# Patient Record
Sex: FEMALE | Race: WHITE | Employment: OTHER | URBAN - METROPOLITAN AREA
[De-identification: names, ages, dates, MRNs, and addresses within clinical notes are randomized per-mention and may not be internally consistent; named-entity substitution may affect disease eponyms.]

---

## 2018-01-01 ENCOUNTER — ANESTHESIA EVENT (OUTPATIENT)
Dept: SURGERY | Age: 83
End: 2018-01-01
Payer: MEDICARE

## 2018-01-01 ENCOUNTER — APPOINTMENT (OUTPATIENT)
Dept: GENERAL RADIOLOGY | Age: 83
End: 2018-01-01
Attending: ORTHOPAEDIC SURGERY
Payer: MEDICARE

## 2018-01-01 ENCOUNTER — ANESTHESIA (OUTPATIENT)
Dept: SURGERY | Age: 83
End: 2018-01-01
Payer: MEDICARE

## 2018-01-01 ENCOUNTER — HOSPITAL ENCOUNTER (OUTPATIENT)
Dept: GENERAL RADIOLOGY | Age: 83
Discharge: HOME | End: 2018-06-25
Payer: MEDICARE

## 2018-01-01 ENCOUNTER — HOSPITAL ENCOUNTER (OUTPATIENT)
Age: 83
End: 2018-07-18
Attending: ORTHOPAEDIC SURGERY | Admitting: ORTHOPAEDIC SURGERY
Payer: MEDICARE

## 2018-01-01 ENCOUNTER — ANESTHESIA EVENT (OUTPATIENT)
Dept: MEDSURG UNIT | Age: 83
End: 2018-01-01
Payer: MEDICARE

## 2018-01-01 ENCOUNTER — ANESTHESIA (OUTPATIENT)
Dept: MEDSURG UNIT | Age: 83
End: 2018-01-01
Payer: MEDICARE

## 2018-01-01 ENCOUNTER — HOSPITAL ENCOUNTER (OUTPATIENT)
Dept: PREADMISSION TESTING | Age: 83
Discharge: HOME | End: 2018-06-25
Payer: MEDICARE

## 2018-01-01 VITALS
WEIGHT: 227.31 LBS | TEMPERATURE: 98.1 F | OXYGEN SATURATION: 99 % | SYSTOLIC BLOOD PRESSURE: 166 MMHG | DIASTOLIC BLOOD PRESSURE: 91 MMHG | HEART RATE: 91 BPM | RESPIRATION RATE: 16 BRPM | HEIGHT: 65 IN | BODY MASS INDEX: 37.87 KG/M2

## 2018-01-01 VITALS — WEIGHT: 229.5 LBS | BODY MASS INDEX: 38.24 KG/M2 | HEIGHT: 65 IN

## 2018-01-01 DIAGNOSIS — R06.02 SHORTNESS OF BREATH: ICD-10-CM

## 2018-01-01 LAB
ABO + RH BLD: NORMAL
ANION GAP SERPL CALC-SCNC: 13 MMOL/L (ref 3–18)
ANION GAP SERPL CALC-SCNC: 9 MMOL/L (ref 3–18)
ATRIAL RATE: 76 BPM
BACTERIA SPEC CULT: NORMAL
BLOOD GROUP ANTIBODIES SERPL: NORMAL
BUN SERPL-MCNC: 20 MG/DL (ref 7–18)
BUN SERPL-MCNC: 21 MG/DL (ref 7–18)
BUN/CREAT SERPL: 18 (ref 12–20)
BUN/CREAT SERPL: 26 (ref 12–20)
CALCIUM SERPL-MCNC: 8.7 MG/DL (ref 8.5–10.1)
CALCIUM SERPL-MCNC: 8.9 MG/DL (ref 8.5–10.1)
CALCULATED P AXIS, ECG09: -26 DEGREES
CALCULATED R AXIS, ECG10: 62 DEGREES
CALCULATED T AXIS, ECG11: 56 DEGREES
CHLORIDE SERPL-SCNC: 100 MMOL/L (ref 100–108)
CHLORIDE SERPL-SCNC: 101 MMOL/L (ref 100–108)
CK MB CFR SERPL CALC: ABNORMAL % (ref 0–4)
CK MB SERPL-MCNC: <1 NG/ML (ref 5–25)
CK SERPL-CCNC: 570 U/L (ref 26–192)
CO2 SERPL-SCNC: 25 MMOL/L (ref 21–32)
CO2 SERPL-SCNC: 29 MMOL/L (ref 21–32)
CREAT SERPL-MCNC: 0.8 MG/DL (ref 0.6–1.3)
CREAT SERPL-MCNC: 1.11 MG/DL (ref 0.6–1.3)
DIAGNOSIS, 93000: NORMAL
ERYTHROCYTE [DISTWIDTH] IN BLOOD BY AUTOMATED COUNT: 13.6 % (ref 11.6–14.5)
ERYTHROCYTE [DISTWIDTH] IN BLOOD BY AUTOMATED COUNT: 13.7 % (ref 11.6–14.5)
GLUCOSE BLD STRIP.AUTO-MCNC: 157 MG/DL (ref 70–110)
GLUCOSE SERPL-MCNC: 191 MG/DL (ref 74–99)
GLUCOSE SERPL-MCNC: 84 MG/DL (ref 74–99)
HCT VFR BLD AUTO: 41.5 % (ref 35–45)
HCT VFR BLD AUTO: 41.8 % (ref 35–45)
HGB BLD-MCNC: 13.4 G/DL (ref 12–16)
HGB BLD-MCNC: 13.7 G/DL (ref 12–16)
MCH RBC QN AUTO: 28.2 PG (ref 24–34)
MCH RBC QN AUTO: 28.3 PG (ref 24–34)
MCHC RBC AUTO-ENTMCNC: 32.3 G/DL (ref 31–37)
MCHC RBC AUTO-ENTMCNC: 32.8 G/DL (ref 31–37)
MCV RBC AUTO: 86.2 FL (ref 74–97)
MCV RBC AUTO: 87.7 FL (ref 74–97)
P-R INTERVAL, ECG05: 192 MS
PLATELET # BLD AUTO: 228 K/UL (ref 135–420)
PLATELET # BLD AUTO: 248 K/UL (ref 135–420)
PMV BLD AUTO: 10.7 FL (ref 9.2–11.8)
PMV BLD AUTO: 11.2 FL (ref 9.2–11.8)
POTASSIUM SERPL-SCNC: 4.1 MMOL/L (ref 3.5–5.5)
POTASSIUM SERPL-SCNC: 4.2 MMOL/L (ref 3.5–5.5)
Q-T INTERVAL, ECG07: 402 MS
QRS DURATION, ECG06: 72 MS
QTC CALCULATION (BEZET), ECG08: 452 MS
RBC # BLD AUTO: 4.73 M/UL (ref 4.2–5.3)
RBC # BLD AUTO: 4.85 M/UL (ref 4.2–5.3)
SERVICE CMNT-IMP: NORMAL
SODIUM SERPL-SCNC: 138 MMOL/L (ref 136–145)
SODIUM SERPL-SCNC: 139 MMOL/L (ref 136–145)
SPECIMEN EXP DATE BLD: NORMAL
TROPONIN I SERPL-MCNC: 0.04 NG/ML (ref 0–0.06)
VENTRICULAR RATE, ECG03: 76 BPM
WBC # BLD AUTO: 14.3 K/UL (ref 4.6–13.2)
WBC # BLD AUTO: 8.7 K/UL (ref 4.6–13.2)

## 2018-01-01 PROCEDURE — 74011250636 HC RX REV CODE- 250/636: Performed by: ORTHOPAEDIC SURGERY

## 2018-01-01 PROCEDURE — 77030018836 HC SOL IRR NACL ICUM -A: Performed by: ORTHOPAEDIC SURGERY

## 2018-01-01 PROCEDURE — 74011000250 HC RX REV CODE- 250: Performed by: ORTHOPAEDIC SURGERY

## 2018-01-01 PROCEDURE — 74011000272 HC RX REV CODE- 272: Performed by: ORTHOPAEDIC SURGERY

## 2018-01-01 PROCEDURE — 77030020782 HC GWN BAIR PAWS FLX 3M -B: Performed by: ORTHOPAEDIC SURGERY

## 2018-01-01 PROCEDURE — 86900 BLOOD TYPING SEROLOGIC ABO: CPT | Performed by: ORTHOPAEDIC SURGERY

## 2018-01-01 PROCEDURE — 77030008683 HC TU ET CUF COVD -A: Performed by: ANESTHESIOLOGY

## 2018-01-01 PROCEDURE — 77030032490 HC SLV COMPR SCD KNE COVD -B: Performed by: ORTHOPAEDIC SURGERY

## 2018-01-01 PROCEDURE — 74011250637 HC RX REV CODE- 250/637: Performed by: PHYSICIAN ASSISTANT

## 2018-01-01 PROCEDURE — G8979 MOBILITY GOAL STATUS: HCPCS

## 2018-01-01 PROCEDURE — 77030008462 HC STPLR SKN PROX J&J -A: Performed by: ORTHOPAEDIC SURGERY

## 2018-01-01 PROCEDURE — 80048 BASIC METABOLIC PNL TOTAL CA: CPT | Performed by: INTERNAL MEDICINE

## 2018-01-01 PROCEDURE — 77030034849: Performed by: ORTHOPAEDIC SURGERY

## 2018-01-01 PROCEDURE — 97162 PT EVAL MOD COMPLEX 30 MIN: CPT

## 2018-01-01 PROCEDURE — 77030013079 HC BLNKT BAIR HGGR 3M -A: Performed by: ANESTHESIOLOGY

## 2018-01-01 PROCEDURE — 77010033678 HC OXYGEN DAILY

## 2018-01-01 PROCEDURE — 74011250636 HC RX REV CODE- 250/636: Performed by: PHYSICIAN ASSISTANT

## 2018-01-01 PROCEDURE — 77030003029 HC SUT VCRL J&J -B: Performed by: ORTHOPAEDIC SURGERY

## 2018-01-01 PROCEDURE — 36415 COLL VENOUS BLD VENIPUNCTURE: CPT | Performed by: PHYSICIAN ASSISTANT

## 2018-01-01 PROCEDURE — C1713 ANCHOR/SCREW BN/BN,TIS/BN: HCPCS | Performed by: ORTHOPAEDIC SURGERY

## 2018-01-01 PROCEDURE — 76210000016 HC OR PH I REC 1 TO 1.5 HR: Performed by: ORTHOPAEDIC SURGERY

## 2018-01-01 PROCEDURE — 93005 ELECTROCARDIOGRAM TRACING: CPT

## 2018-01-01 PROCEDURE — 85027 COMPLETE CBC AUTOMATED: CPT | Performed by: ORTHOPAEDIC SURGERY

## 2018-01-01 PROCEDURE — 85027 COMPLETE CBC AUTOMATED: CPT | Performed by: PHYSICIAN ASSISTANT

## 2018-01-01 PROCEDURE — 36415 COLL VENOUS BLD VENIPUNCTURE: CPT | Performed by: ORTHOPAEDIC SURGERY

## 2018-01-01 PROCEDURE — 74011000258 HC RX REV CODE- 258: Performed by: ORTHOPAEDIC SURGERY

## 2018-01-01 PROCEDURE — 80048 BASIC METABOLIC PNL TOTAL CA: CPT | Performed by: ORTHOPAEDIC SURGERY

## 2018-01-01 PROCEDURE — 74011250637 HC RX REV CODE- 250/637: Performed by: ANESTHESIOLOGY

## 2018-01-01 PROCEDURE — 82962 GLUCOSE BLOOD TEST: CPT

## 2018-01-01 PROCEDURE — 92950 HEART/LUNG RESUSCITATION CPR: CPT

## 2018-01-01 PROCEDURE — 77030013708 HC HNDPC SUC IRR PULS STRY –B: Performed by: ORTHOPAEDIC SURGERY

## 2018-01-01 PROCEDURE — 77030011640 HC PAD GRND REM COVD -A: Performed by: ORTHOPAEDIC SURGERY

## 2018-01-01 PROCEDURE — 82550 ASSAY OF CK (CPK): CPT | Performed by: INTERNAL MEDICINE

## 2018-01-01 PROCEDURE — 77030008477 HC STYL SATN SLP COVD -A: Performed by: ANESTHESIOLOGY

## 2018-01-01 PROCEDURE — 77030003028 HC SUT VCRL J&J -A: Performed by: ORTHOPAEDIC SURGERY

## 2018-01-01 PROCEDURE — 87641 MR-STAPH DNA AMP PROBE: CPT | Performed by: ORTHOPAEDIC SURGERY

## 2018-01-01 PROCEDURE — 77030004402 HC BUR NEUR STRY -C: Performed by: ORTHOPAEDIC SURGERY

## 2018-01-01 PROCEDURE — 71046 X-RAY EXAM CHEST 2 VIEWS: CPT

## 2018-01-01 PROCEDURE — 77030012406 HC DRN WND PENRS BARD -A: Performed by: ORTHOPAEDIC SURGERY

## 2018-01-01 PROCEDURE — 97530 THERAPEUTIC ACTIVITIES: CPT

## 2018-01-01 PROCEDURE — G8978 MOBILITY CURRENT STATUS: HCPCS

## 2018-01-01 PROCEDURE — 74011250636 HC RX REV CODE- 250/636

## 2018-01-01 PROCEDURE — 76010000132 HC OR TIME 2.5 TO 3 HR: Performed by: ORTHOPAEDIC SURGERY

## 2018-01-01 PROCEDURE — 77030014650 HC SEAL MTRX FLOSEL BAXT -C: Performed by: ORTHOPAEDIC SURGERY

## 2018-01-01 PROCEDURE — 31500 INSERT EMERGENCY AIRWAY: CPT

## 2018-01-01 PROCEDURE — 76060000036 HC ANESTHESIA 2.5 TO 3 HR: Performed by: ORTHOPAEDIC SURGERY

## 2018-01-01 DEVICE — IMPLANTABLE DEVICE: Type: IMPLANTABLE DEVICE | Site: SPINE LUMBAR | Status: FUNCTIONAL

## 2018-01-01 DEVICE — SET SCR SPNL L5-7MM PEDFUSE: Type: IMPLANTABLE DEVICE | Site: SPINE LUMBAR | Status: FUNCTIONAL

## 2018-01-01 DEVICE — SCREW SPNL L55MM OD7MM SLD GEN 3 PEDFUSE RESET: Type: IMPLANTABLE DEVICE | Site: SPINE LUMBAR | Status: FUNCTIONAL

## 2018-01-01 DEVICE — SCREW SPNL L50MM OD7MM SLD GEN 3 PEDFUSE RESET: Type: IMPLANTABLE DEVICE | Site: SPINE LUMBAR | Status: FUNCTIONAL

## 2018-01-01 DEVICE — ROD SPNL L100MM OD5.5MM LORDTC PEDFUSE: Type: IMPLANTABLE DEVICE | Site: SPINE LUMBAR | Status: FUNCTIONAL

## 2018-01-01 DEVICE — SCREW SPNL CRV 45-65 MM ADJ CRUX ASSY: Type: IMPLANTABLE DEVICE | Site: SPINE LUMBAR | Status: FUNCTIONAL

## 2018-01-01 DEVICE — ROD SPNL L90MM OD5.5MM PEDFUSE: Type: IMPLANTABLE DEVICE | Site: SPINE LUMBAR | Status: FUNCTIONAL

## 2018-01-01 RX ORDER — CEFAZOLIN SODIUM/WATER 2 G/20 ML
2 SYRINGE (ML) INTRAVENOUS EVERY 8 HOURS
Status: DISCONTINUED | OUTPATIENT
Start: 2018-01-01 | End: 2018-01-01 | Stop reason: HOSPADM

## 2018-01-01 RX ORDER — DIPHENHYDRAMINE HYDROCHLORIDE 50 MG/ML
12.5 INJECTION, SOLUTION INTRAMUSCULAR; INTRAVENOUS
Status: DISCONTINUED | OUTPATIENT
Start: 2018-01-01 | End: 2018-01-01 | Stop reason: HOSPADM

## 2018-01-01 RX ORDER — DOCUSATE SODIUM 100 MG/1
100 CAPSULE, LIQUID FILLED ORAL 2 TIMES DAILY
Status: DISCONTINUED | OUTPATIENT
Start: 2018-01-01 | End: 2018-01-01 | Stop reason: HOSPADM

## 2018-01-01 RX ORDER — GLYCOPYRROLATE 0.2 MG/ML
INJECTION INTRAMUSCULAR; INTRAVENOUS AS NEEDED
Status: DISCONTINUED | OUTPATIENT
Start: 2018-01-01 | End: 2018-01-01 | Stop reason: HOSPADM

## 2018-01-01 RX ORDER — ONDANSETRON 2 MG/ML
INJECTION INTRAMUSCULAR; INTRAVENOUS AS NEEDED
Status: DISCONTINUED | OUTPATIENT
Start: 2018-01-01 | End: 2018-01-01 | Stop reason: HOSPADM

## 2018-01-01 RX ORDER — NALOXONE HYDROCHLORIDE 0.4 MG/ML
0.1 INJECTION, SOLUTION INTRAMUSCULAR; INTRAVENOUS; SUBCUTANEOUS AS NEEDED
Status: DISCONTINUED | OUTPATIENT
Start: 2018-01-01 | End: 2018-01-01 | Stop reason: HOSPADM

## 2018-01-01 RX ORDER — FENTANYL CITRATE 50 UG/ML
INJECTION, SOLUTION INTRAMUSCULAR; INTRAVENOUS AS NEEDED
Status: DISCONTINUED | OUTPATIENT
Start: 2018-01-01 | End: 2018-01-01 | Stop reason: HOSPADM

## 2018-01-01 RX ORDER — OMEPRAZOLE 20 MG/1
20 CAPSULE, DELAYED RELEASE ORAL 2 TIMES DAILY
Status: DISCONTINUED | OUTPATIENT
Start: 2018-01-01 | End: 2018-01-01 | Stop reason: HOSPADM

## 2018-01-01 RX ORDER — SODIUM CHLORIDE, SODIUM LACTATE, POTASSIUM CHLORIDE, CALCIUM CHLORIDE 600; 310; 30; 20 MG/100ML; MG/100ML; MG/100ML; MG/100ML
125 INJECTION, SOLUTION INTRAVENOUS CONTINUOUS
Status: DISCONTINUED | OUTPATIENT
Start: 2018-01-01 | End: 2018-01-01 | Stop reason: HOSPADM

## 2018-01-01 RX ORDER — ROCURONIUM BROMIDE 10 MG/ML
INJECTION, SOLUTION INTRAVENOUS AS NEEDED
Status: DISCONTINUED | OUTPATIENT
Start: 2018-01-01 | End: 2018-01-01 | Stop reason: HOSPADM

## 2018-01-01 RX ORDER — EPHEDRINE SULFATE/0.9% NACL/PF 25 MG/5 ML
SYRINGE (ML) INTRAVENOUS AS NEEDED
Status: DISCONTINUED | OUTPATIENT
Start: 2018-01-01 | End: 2018-01-01 | Stop reason: HOSPADM

## 2018-01-01 RX ORDER — MELATONIN
1000 DAILY
COMMUNITY

## 2018-01-01 RX ORDER — LOSARTAN POTASSIUM 25 MG/1
25 TABLET ORAL DAILY
Status: DISCONTINUED | OUTPATIENT
Start: 2018-01-01 | End: 2018-01-01 | Stop reason: HOSPADM

## 2018-01-01 RX ORDER — TRAMADOL HYDROCHLORIDE 50 MG/1
100 TABLET ORAL
Status: DISCONTINUED | OUTPATIENT
Start: 2018-01-01 | End: 2018-01-01 | Stop reason: HOSPADM

## 2018-01-01 RX ORDER — DIAZEPAM 2 MG/1
2 TABLET ORAL
Status: DISCONTINUED | OUTPATIENT
Start: 2018-01-01 | End: 2018-01-01 | Stop reason: HOSPADM

## 2018-01-01 RX ORDER — LUBIPROSTONE 24 UG/1
24 CAPSULE, GELATIN COATED ORAL 2 TIMES DAILY WITH MEALS
Status: DISCONTINUED | OUTPATIENT
Start: 2018-01-01 | End: 2018-01-01 | Stop reason: HOSPADM

## 2018-01-01 RX ORDER — ONDANSETRON 4 MG/1
4 TABLET, ORALLY DISINTEGRATING ORAL
Status: DISCONTINUED | OUTPATIENT
Start: 2018-01-01 | End: 2018-01-01 | Stop reason: HOSPADM

## 2018-01-01 RX ORDER — HYDROCHLOROTHIAZIDE 25 MG/1
25 TABLET ORAL DAILY
COMMUNITY

## 2018-01-01 RX ORDER — FLUTICASONE FUROATE AND VILANTEROL 100; 25 UG/1; UG/1
1 POWDER RESPIRATORY (INHALATION) DAILY
Status: DISCONTINUED | OUTPATIENT
Start: 2018-01-01 | End: 2018-01-01 | Stop reason: HOSPADM

## 2018-01-01 RX ORDER — NEOSTIGMINE METHYLSULFATE 5 MG/5 ML
SYRINGE (ML) INTRAVENOUS AS NEEDED
Status: DISCONTINUED | OUTPATIENT
Start: 2018-01-01 | End: 2018-01-01 | Stop reason: HOSPADM

## 2018-01-01 RX ORDER — ACETAMINOPHEN 325 MG/1
650 TABLET ORAL
Status: DISCONTINUED | OUTPATIENT
Start: 2018-01-01 | End: 2018-01-01 | Stop reason: HOSPADM

## 2018-01-01 RX ORDER — CEFAZOLIN SODIUM/WATER 2 G/20 ML
2 SYRINGE (ML) INTRAVENOUS ONCE
Status: COMPLETED | OUTPATIENT
Start: 2018-01-01 | End: 2018-01-01

## 2018-01-01 RX ORDER — LIDOCAINE HYDROCHLORIDE 20 MG/ML
INJECTION, SOLUTION EPIDURAL; INFILTRATION; INTRACAUDAL; PERINEURAL AS NEEDED
Status: DISCONTINUED | OUTPATIENT
Start: 2018-01-01 | End: 2018-01-01 | Stop reason: HOSPADM

## 2018-01-01 RX ORDER — DEXAMETHASONE SODIUM PHOSPHATE 4 MG/ML
INJECTION, SOLUTION INTRA-ARTICULAR; INTRALESIONAL; INTRAMUSCULAR; INTRAVENOUS; SOFT TISSUE AS NEEDED
Status: DISCONTINUED | OUTPATIENT
Start: 2018-01-01 | End: 2018-01-01 | Stop reason: HOSPADM

## 2018-01-01 RX ORDER — LOSARTAN POTASSIUM 25 MG/1
TABLET ORAL DAILY
COMMUNITY

## 2018-01-01 RX ORDER — SODIUM CHLORIDE 0.9 % (FLUSH) 0.9 %
5-10 SYRINGE (ML) INJECTION EVERY 8 HOURS
Status: DISCONTINUED | OUTPATIENT
Start: 2018-01-01 | End: 2018-01-01 | Stop reason: HOSPADM

## 2018-01-01 RX ORDER — HYDROCHLOROTHIAZIDE 25 MG/1
25 TABLET ORAL DAILY
Status: DISCONTINUED | OUTPATIENT
Start: 2018-01-01 | End: 2018-01-01 | Stop reason: HOSPADM

## 2018-01-01 RX ORDER — TRAMADOL HYDROCHLORIDE 50 MG/1
50 TABLET ORAL
Status: DISCONTINUED | OUTPATIENT
Start: 2018-01-01 | End: 2018-01-01 | Stop reason: HOSPADM

## 2018-01-01 RX ORDER — PROPOFOL 10 MG/ML
INJECTION, EMULSION INTRAVENOUS AS NEEDED
Status: DISCONTINUED | OUTPATIENT
Start: 2018-01-01 | End: 2018-01-01 | Stop reason: HOSPADM

## 2018-01-01 RX ORDER — HYDROMORPHONE HYDROCHLORIDE 2 MG/ML
INJECTION, SOLUTION INTRAMUSCULAR; INTRAVENOUS; SUBCUTANEOUS AS NEEDED
Status: DISCONTINUED | OUTPATIENT
Start: 2018-01-01 | End: 2018-01-01 | Stop reason: HOSPADM

## 2018-01-01 RX ORDER — SODIUM CHLORIDE, SODIUM LACTATE, POTASSIUM CHLORIDE, CALCIUM CHLORIDE 600; 310; 30; 20 MG/100ML; MG/100ML; MG/100ML; MG/100ML
125 INJECTION, SOLUTION INTRAVENOUS CONTINUOUS
Status: CANCELLED | OUTPATIENT
Start: 2018-01-01

## 2018-01-01 RX ORDER — SODIUM CHLORIDE 0.9 % (FLUSH) 0.9 %
5-10 SYRINGE (ML) INJECTION AS NEEDED
Status: DISCONTINUED | OUTPATIENT
Start: 2018-01-01 | End: 2018-01-01 | Stop reason: HOSPADM

## 2018-01-01 RX ORDER — ACETAMINOPHEN 500 MG
1000 TABLET ORAL ONCE
Status: COMPLETED | OUTPATIENT
Start: 2018-01-01 | End: 2018-01-01

## 2018-01-01 RX ORDER — OMEPRAZOLE 20 MG/1
20 CAPSULE, DELAYED RELEASE ORAL 2 TIMES DAILY
COMMUNITY

## 2018-01-01 RX ADMIN — Medication 2 G: at 03:50

## 2018-01-01 RX ADMIN — SODIUM CHLORIDE, SODIUM LACTATE, POTASSIUM CHLORIDE, AND CALCIUM CHLORIDE: 600; 310; 30; 20 INJECTION, SOLUTION INTRAVENOUS at 11:07

## 2018-01-01 RX ADMIN — SODIUM CHLORIDE, SODIUM LACTATE, POTASSIUM CHLORIDE, AND CALCIUM CHLORIDE 125 ML/HR: 600; 310; 30; 20 INJECTION, SOLUTION INTRAVENOUS at 15:20

## 2018-01-01 RX ADMIN — TRANEXAMIC ACID 1 G: 100 INJECTION, SOLUTION INTRAVENOUS at 10:45

## 2018-01-01 RX ADMIN — HYDROMORPHONE HYDROCHLORIDE 0.5 MG: 2 INJECTION, SOLUTION INTRAMUSCULAR; INTRAVENOUS; SUBCUTANEOUS at 11:58

## 2018-01-01 RX ADMIN — PROPOFOL 170 MG: 10 INJECTION, EMULSION INTRAVENOUS at 10:23

## 2018-01-01 RX ADMIN — Medication 10 MG: at 10:45

## 2018-01-01 RX ADMIN — SODIUM CHLORIDE, SODIUM LACTATE, POTASSIUM CHLORIDE, AND CALCIUM CHLORIDE 125 ML/HR: 600; 310; 30; 20 INJECTION, SOLUTION INTRAVENOUS at 07:59

## 2018-01-01 RX ADMIN — IPRATROPIUM BROMIDE AND ALBUTEROL 2 PUFF: 20; 100 SPRAY, METERED RESPIRATORY (INHALATION) at 21:52

## 2018-01-01 RX ADMIN — GLYCOPYRROLATE 0.3 MG: 0.2 INJECTION INTRAMUSCULAR; INTRAVENOUS at 11:18

## 2018-01-01 RX ADMIN — ACETAMINOPHEN 1000 MG: 500 TABLET, FILM COATED ORAL at 08:39

## 2018-01-01 RX ADMIN — FENTANYL CITRATE 50 MCG: 50 INJECTION, SOLUTION INTRAMUSCULAR; INTRAVENOUS at 11:16

## 2018-01-01 RX ADMIN — DEXAMETHASONE SODIUM PHOSPHATE 8 MG: 4 INJECTION, SOLUTION INTRA-ARTICULAR; INTRALESIONAL; INTRAMUSCULAR; INTRAVENOUS; SOFT TISSUE at 10:45

## 2018-01-01 RX ADMIN — Medication 2 G: at 10:51

## 2018-01-01 RX ADMIN — Medication 10 MG: at 11:08

## 2018-01-01 RX ADMIN — OMEPRAZOLE 20 MG: 20 CAPSULE, DELAYED RELEASE ORAL at 21:48

## 2018-01-01 RX ADMIN — Medication 10 ML: at 05:08

## 2018-01-01 RX ADMIN — SODIUM CHLORIDE, SODIUM LACTATE, POTASSIUM CHLORIDE, AND CALCIUM CHLORIDE: 600; 310; 30; 20 INJECTION, SOLUTION INTRAVENOUS at 12:52

## 2018-01-01 RX ADMIN — ROCURONIUM BROMIDE 10 MG: 10 INJECTION, SOLUTION INTRAVENOUS at 10:58

## 2018-01-01 RX ADMIN — ONDANSETRON 4 MG: 4 TABLET, ORALLY DISINTEGRATING ORAL at 18:47

## 2018-01-01 RX ADMIN — Medication 10 ML: at 22:00

## 2018-01-01 RX ADMIN — Medication 2 G: at 18:30

## 2018-01-01 RX ADMIN — Medication: at 13:53

## 2018-01-01 RX ADMIN — FENTANYL CITRATE 75 MCG: 50 INJECTION, SOLUTION INTRAMUSCULAR; INTRAVENOUS at 10:23

## 2018-01-01 RX ADMIN — ONDANSETRON 4 MG: 2 INJECTION INTRAMUSCULAR; INTRAVENOUS at 10:45

## 2018-01-01 RX ADMIN — FENTANYL CITRATE 50 MCG: 50 INJECTION, SOLUTION INTRAMUSCULAR; INTRAVENOUS at 10:57

## 2018-01-01 RX ADMIN — SODIUM CHLORIDE, SODIUM LACTATE, POTASSIUM CHLORIDE, AND CALCIUM CHLORIDE 125 ML/HR: 600; 310; 30; 20 INJECTION, SOLUTION INTRAVENOUS at 01:50

## 2018-01-01 RX ADMIN — LIDOCAINE HYDROCHLORIDE 100 MG: 20 INJECTION, SOLUTION EPIDURAL; INFILTRATION; INTRACAUDAL; PERINEURAL at 10:23

## 2018-01-01 RX ADMIN — CHLORASEPTIC 1 SPRAY: 1.5 LIQUID ORAL at 00:20

## 2018-01-01 RX ADMIN — Medication 10 ML: at 15:19

## 2018-01-01 RX ADMIN — LUBIPROSTONE 24 MCG: 24 CAPSULE, GELATIN COATED ORAL at 18:29

## 2018-01-01 RX ADMIN — FENTANYL CITRATE 25 MCG: 50 INJECTION, SOLUTION INTRAMUSCULAR; INTRAVENOUS at 10:15

## 2018-01-01 RX ADMIN — DOCUSATE SODIUM 100 MG: 100 CAPSULE, LIQUID FILLED ORAL at 21:48

## 2018-01-01 RX ADMIN — ROCURONIUM BROMIDE 50 MG: 10 INJECTION, SOLUTION INTRAVENOUS at 10:23

## 2018-01-01 RX ADMIN — Medication 10 MG: at 10:27

## 2018-01-01 RX ADMIN — GLYCOPYRROLATE 0.4 MG: 0.2 INJECTION INTRAMUSCULAR; INTRAVENOUS at 12:46

## 2018-01-01 RX ADMIN — Medication 3 MG: at 12:46

## 2018-01-01 RX ADMIN — ONDANSETRON 4 MG: 4 TABLET, ORALLY DISINTEGRATING ORAL at 03:50

## 2018-06-25 NOTE — PERIOP NOTES
No sleep apnea. Pt has dentures but no other removable prosthetic devices. [employer] given to patient with instructions. No family history of MH. Pt meets criteria for special populations.

## 2018-06-27 NOTE — PERIOP NOTES
Faxed anesthesia request to Dr. Sanjay Hogan office and v/m left for Declan Schrader at his office to confirm receipt of information.

## 2018-07-05 NOTE — H&P
Patient Name:  Melida Payment   YOB: 1935      Chief Complaint:  Lower back pain, spinal stenosis, and spondylolisthesis. History of Chief Complaint:  Ms. Ramon Leslie is an 74-year-old female who is being seen for lower back pain, spinal stenosis, and spondylolisthesis. She says her back, hips, and legs are still giving her problems and pain. She has difficulty doing most of her activities of daily living. She has been going through pain management for this with multiple injections, medications, and therapy. She says she has been having pain in her upper thighs and lower back for at least 20 years. She states that the repeat lumbar epidural in February may have helped for a week but she continues to have pain in her lower back with radiation of pain into the anterior thighs and associated numbness, tingling, pins and needles in both legs. She states that over the past several months her walking tolerance has significantly declined. She got stuck in a grocery store because she felt like her legs stopped working. They were very heavy and tired, and she had to find a place to sit down before her symptoms eased up enough to go home. Walking and standing significantly exacerbates her leg symptoms; sitting and resting improves them. Her back pain is not as bad as her leg symptoms. She describes her pain as aching, shooting, burning, and numb. At best the pain is a 7 and at worst the pain is a 9. She has also had some swelling in her right knee. She had injured the knee several years ago and had surgery with Dr. Jose Luis Kinsey. Ever since then, she has had periodic knee pain. Usually it does not bother her but more recently it has been aching more and her knee feels a little bit swollen. She denies any new bowel or bladder issues. She is using Aleve occasionally for the pain control. Her back pain is located in the midline and right paramidline lumbar region.     Past Medical/Surgical History:    Disease/Disorder Type Date Side Surgery Date Side Comment   COPD          Spinal stenosis          Hiatal hernia          Sciatica          Arthritis          Bronchitis          Hypertension          Osteoarthritis          GERD          Asthma              Arthroscopy knee  left        Appendectomy 1947         Cholecystectomy 1985         Hysterectomy, total 1988         Hernia repair, umbilical 7358         Hernia repair 2013     Pneumonia  2002 right         Allergies:    Ingredient Reaction Medication Name Comment   LISINOPRIL      ASPIRIN Hives/Skin Rash       Current Medications:    Medication Directions   hydrochlorothiazide 25 mg tablet take 1 tablet by oral route  every day   losartan 25 mg tablet take 1 tablet by oral route  every day   Combivent Respimat 20 mcg-100 mcg/actuation aerosol inhaler inhale 1 puff by inhalation route 3 times every day ; may take additional puffs as needed not to exceed 6 puffs in 24hrs   nystatin 100,000 unit/mL oral suspension take 5 milliliter by oral route 4 times every day   Vitamin D3 1,000 unit capsule    Dulera 200 mcg-5 mcg/actuation HFA aerosol inhaler    omeprazole 20 mg capsule,delayed release take 1 capsule by oral route 2 times every day before a meal     Social History:  The patient has some college education. She is  with one child. She has never used recreational drugs. She tries to exercise by walking her dog five times a day. SMOKING  Status Tobacco Type Units Per Day Yrs Used   Never smoker        ALCOHOL  There is a history of alcohol use. Type: Wine. 1 glass consumed 2 times a week.     Family History:    Disease Detail Family Member Age Cause of Death Comments   Arthritis Mother  N    Cardiovascular disease Mother  N    Hypertension Mother  N    Family history of Stroke   N    Heart disease Father  N    Stroke Sister  N      Review of Systems:    Pertinent positives include sore throat/hoarseness, weight change, difficulty breathing, fracture/dislocation, frequent urination, gas/bloating, hemorrhoids, indigestion, joint pain, joint stiffness, loss of balance, numbness/tingling, shortness of breath and wheezing. Pertinent negatives include blurred vision, chest pain, chills, cold, discharge of the eyes, dizziness, double vision, fever, headache, hearing loss, heart murmur, itching of the eyes, palpitations, redness of the eyes, rheumatic fever, ringing in ears, abdominal pain, anxiety, bipolar disorder, bladder/kidney infection, bloody stool, blood in urine, burning sensation, changes in mood, chronic cough, depression, diarrhea, difficulty swallowing, fainting, gout, incontinence, memory loss, muscle weakness, nausea/vomiting, pain on breathing, painful urination, psoriasis, rash/itching, Raynaud's phenomenon, rheumatoid disease, seizure disorder, sprain/strain, swelling of feet, tendonitis and varicose veins. Physical Examination:    General:  The patient appears healthy. Cardiovascular:  Arterial pulses are normal.  Skin:  The skin is normal appearing with no contusions or wounds noted. Heart- RRR  Lungs-CTA susannah  Abdomen- +BS,soft,nontender  Musculoskeletal:  There is tenderness around the paravertebral spinal muscles. Otherwise, the thoracolumbar spine has normal kyphosis, a normal appearance, and no scoliosis. There is full range of motion of the cervical, thoracic, and lumbar spine and of the hips, knees, and ankles. Straight leg raising and crossed straight leg raising tests are negative. Neurological:  There is no weakness in the thoracic, lumbar, or sacral spine or in the lower extremities or hips. Deep tendon reflexes are present and normal bilaterally. Ankle and knee jerks are normal with no clonus. Radiograph Examination:  Review of her lumbar spine x-rays done 1/2017 reveals degenerative disc disease at L2 to L5.     Review of her MRI scan of the lumbar spine [facility] 6/7/18 reveals severe spinal stenosis and spondylolisthesis at L2 to L5.    Plan:  Ms. Justo Benoit and I had a long discussion about the treatments for her lumbar spinal stenosis, spondylolisthesis, and degenerative disc disease including surgical intervention, the risks, and benefits as well as the different surgical approaches and decision making. We also discussed nonsurgical care for this condition including medications, injections, physical therapy, rehabilitation, activity modification, and brace utilization. At this point, having failed conservative care, she would like to proceed with operative intervention. We will plan for L2 to L5 decompression and fusion. The risks versus the benefits as well as the alternatives were fully explained to the patient. Risks include, but are not limited to, paralysis, death, heart attack, stroke, pulmonary embolism, deep vein thrombosis, infection, failure to relieve pain, increase in back or leg pain, reherniation of disc material, need for revision surgery, scarring, spinal fluid leak, bowel or bladder dysfunction, disease transmission, chronic graft site pain, instrumentation failure, pseudarthrosis, and the need for chronic ambulatory assist devices. The patient states full understanding of the risks and benefits and wishes to proceed. Admitting as inpatient acknowledging increased risk of anesthesia and need for post-operative monitoring of hyperglycemia, respiratory distress, and cardiac stress related to these  COPD      Hiatal hernia      Arthritis   Bronchitis   Hypertension   Osteoarthritis   GERD   Asthma     conditions. Carline Leyva

## 2018-07-09 PROBLEM — M48.062 SPINAL STENOSIS OF LUMBAR REGION WITH NEUROGENIC CLAUDICATION: Status: ACTIVE | Noted: 2018-01-01

## 2018-07-09 PROBLEM — M51.36 DDD (DEGENERATIVE DISC DISEASE), LUMBAR: Status: ACTIVE | Noted: 2018-01-01

## 2018-07-09 PROBLEM — M51.26 HNP (HERNIATED NUCLEUS PULPOSUS), LUMBAR: Status: ACTIVE | Noted: 2018-01-01

## 2018-07-09 PROBLEM — M43.16 SPONDYLOLISTHESIS, LUMBAR REGION: Status: ACTIVE | Noted: 2018-01-01

## 2018-07-17 PROBLEM — M48.061 SPINAL STENOSIS, LUMBAR: Status: ACTIVE | Noted: 2018-01-01

## 2018-07-17 NOTE — PROGRESS NOTES
1927 - Assumed care of pt at this time. Pt in bed with no signs of distress. Pt left with call light within reach and encouraged to call for assistance. 2145 -  Head to toe assessment performed at this time. Pt denies any chest pain or SOB. Pt denies any numbness or tingling to extremities. Pt encouraged to manage pain and to use the incentive spirometer. Pt educated on the side effects of medications taken. Pt left with call light within reach and bed in low position. Will continue to monitor. 0230 - Pt complained of gas. Pt repositioned, and ambulated. Pt stated that she has the same discomfort at home. Pt left in bed with no signs of distress. Will continue to monitor. 0 - Entered patient's room. Patient is responsive and talking and patient suddenly became unresponsive. Deondre Delgadillo is called at this time and RRT is called at 0530. Pt then stopped breathing and Code Blue is called 0532. Chest compressions started and then respiratory arrived. Code ran and completed at [place] [id number]..

## 2018-07-17 NOTE — PERIOP NOTES
TRANSFER - OUT REPORT:    Verbal report given to Vanita Akbar RN (name) on Sebastian Westfall  being transferred to 2 S (unit) for routine progression of care       Report consisted of patients Situation, Background, Assessment and   Recommendations(SBAR). Information from the following report(s) SBAR, Procedure Summary and Intake/Output was reviewed with the receiving nurse. Lines:   Peripheral IV 07/17/18 Left Hand (Active)   Site Assessment Clean, dry, & intact 7/17/2018  1:04 PM   Phlebitis Assessment 0 7/17/2018  1:04 PM   Infiltration Assessment 0 7/17/2018  1:04 PM   Dressing Status Clean, dry, & intact 7/17/2018  1:04 PM   Dressing Type Transparent;Tape 7/17/2018  1:04 PM   Hub Color/Line Status Infusing 7/17/2018  1:04 PM        Opportunity for questions and clarification was provided.       Patient transported with:   O2 @ 2 liters  Registered Nurse

## 2018-07-17 NOTE — PROGRESS NOTES
PT orders received and chart reviewed. Pt has LSO but does not presently have it in room. Pt to call family to bring LSO so pt can participate w/ IP PT eval.  Will return as pt schedule allows and LSO present.

## 2018-07-17 NOTE — PROGRESS NOTES
1512 Received client from PACU in satisfactory condition. Client is A/O X 4. Client is calm and cooperative. No numbness or tingling to the extremities. Skin is warm , dry and skin color is appropriate to race. Bibasilar breath sounds clear. Bowel sounds active . Abdomen is soft and non-tender. Client has Reyes's  catheter post-operatively and is patent and draining. Client has a ABD Pad dressing to the back. Dressing is CDI. Hemovac in place and is patent, charged and draining. MARCELA and SCDS applied bilaterally. Client has 20 gauge IV present in lt hand and running LR. Clients pain is 6/10 on 0-10 scale, encouraged for PCA. Client oriented to call bell use as well as bed use. Client oriented to phone and how to order meals. Call bell within reach. Bed in low position. Three side rails up. 1848 Gave Zofran for Nausea and will continue to Monitor    Shift summary: Patient had uneventful shift. Patient Nausea subsided with med. Pain remained well-controlled with PCA.  No issues/concerns at this time

## 2018-07-17 NOTE — INTERVAL H&P NOTE
H&P Update:  Nara Orta was seen and examined. History and physical has been reviewed. The patient has been examined.  There have been no significant clinical changes since the completion of the originally dated History and Physical.    Signed By: Ramón Schaeffer MD     July 17, 2018 9:31 AM

## 2018-07-17 NOTE — ROUTINE PROCESS
TRANSFER - IN REPORT:    Verbal report received from [facility]) on Ondina Kaur  being received from PACU(unit) for routine post - op      Report consisted of patients Situation, Background, Assessment and   Recommendations(SBAR). Information from the following report(s) SBAR, Kardex, Intake/Output, MAR and Accordion was reviewed with the receiving nurse. Opportunity for questions and clarification was provided.

## 2018-07-17 NOTE — PERIOP NOTES
Reviewed PTA medication list with patient/caregiver and patient/caregiver denies any additional medications. Dual skin assessment completed by Meliza Muhammad RN and Dread Santos RN.

## 2018-07-17 NOTE — OP NOTES
Patient: Tyler Bella MRN: [id number]  SSN: [id number]    YOB: 1935  Age: 82 y.o. Sex: female        Date of Procedure: 7/17/2018   Preoperative Diagnosis: FACET ARTHROSIS, LUMBAR HNP WITH RADICULOPATHY, LUMBAGO LUMBAR RADICULPATHY, LUMBAR SPONDYLOSIS WITH RADICULOPATHY, LUMBOSACRAL SPONDYLOSIS WITH RADICULOPATHY, LUMBAR STENOSIS, ASTHMA, ELEVATED CHOLESTEROL, COPD, OBESITY, REFLUX, HISTORY OF HIATAL HERNIA,   BARRICH ESOPHAGUS  Postoperative Diagnosis: FACET ARTHROSIS, LUMBAR HNP WITH RADICULOPATHY, LUMBAGO LUMBAR RADICULPATHY, LUMBAR SPONDYLOSIS WITH RADICULOPATHY, LUMBOSACRAL SPONDYLOSIS WITH RADICULOPATHY, LUMBAR STENOSIS, ASTHMA, ELEVATED CHOLESTEROL, COPD, OBESITY, REFLUX, HISTORY OF HIATAL HERNIA,   BARRICH ESOPHAGUS    Procedure: Procedure(s):  LUMBAR 2-5 DECOMPRESSION AND FUSION WITH C-ARM, \"SPEC POP\"   Surgeon(s) and Role:     * Sherrie Chapin MD - Primary  Assistant: Karen Aguilar PA-C  Anesthesia: General   Estimated Blood Loss: 150cc  Fluids: 1000cc   Specimens: * No specimens in log *   Findings: same  Complications: none  Implants:   Implant Name Type Inv.  Item Serial No.  Lot No. LRB No. Used Action   SCR SET PEDFUSE 5-7MM --  - S0  SCR SET PEDFUSE 5-7MM --  0 [employer]  N/A 8 Implanted   GRAFT PUTTY DBM H-GENIN 10CC --  - [id number]  GRAFT PUTTY DBM H-GENIN 10CC --  [id number] [employer]ER [id number] N/A 1 Implanted   SCR PDCL RESET SLD 7.0X55MM -- GEN 3 - S0  SCR PDCL RESET SLD 7.0X55MM -- GEN 3 0 [employer]  N/A 6 Implanted   SCR PDCL RESET SLD 7.0X50MM -- GEN 3 - S0  SCR PDCL RESET SLD 7.0X50MM -- GEN 3 0 [employer]ER  N/A 2 Implanted   OMAR SP LORDTC PEDFUSE 5.5X90MM --  - S0  OMAR SP LORDTC PEDFUSE 5.5X90MM --  0 [employer]  N/A 1 Implanted   OMAR SP LORDTC PEDFUSE 5.5X100 --  - S0  OMAR SP LORDTC PEDFUSE 5.5X100 --  0 [employer]ER  N/A 1 Implanted   SCR SPNE ADJ CRV 45-65MM -- PEDFUSE - S0   SCR SPNE ADJ CRV 45-65MM -- PEDFUSE 0 [employer]INE[employer]ON[employer]ER   N/A 1 Implanted         Indications: This is a 82y.o. year-old female who presents with significant back   and bilateral lower extremity pain, worsening ability to do activities of daily living. X-rays and MRI scan revealing severe spinal stenosis, degenerative disk disease and disk herniation. Having failed conservative care, he comes for operative intervention. DESCRIPTION OF PROCEDURE: After correct identification, the patient was   taken to the operating room, placed supine on the table, general   endotracheal anesthesia induced. 2grams of Ancef was given. Patient was then rotated prone onto the Louise Scripture table. Lumbar spine was prepped and draped in the usual sterile fashion. Midline incision was made in the lumbar spine, taken down to the spinous processes of L2-5. The posterior transverse processes bilaterally were expose at L2-5 as seen on intraoperative fluoroscopy. Gelpi retractors were then placed. The wound was then irrigated. Complete wide laminectomy was performed at L3, L4 as well as the inferior   half of L2 bilaterally and the superior half of L5 bilaterally. Removal of more than 1/2 of the medial facets bilaterally allowed excellent midline decompression. Foraminotomies which included undercutting the pars intra-articularis were then performed bilaterally at L2-5 until a Penfield 3 was easily passed through each of the neural foramen. This allowed visualization of the L2, L3, L4 and L5 nerve roots. The wound   was then irrigated. Bur was then used to open the posterior aspect of the   pedicles bilaterally at L2-5. A gearshift probe was then placed through   each of these posterior bur holes, through the pedicle, into vertebral body   under intraoperative fluoroscopy. Ball-tipped probe was then placed through   each gearshift tracts, ensuring the gearshift had only gone through the bone. Pedicle screws from [employer] spinal system were then placed bilaterally at L2-5.    Rods were then fixed to the pedicle screws using the locking caps. Each of these caps were then torqued into position. Intraoperative x-ray revealed excellent alignment of the  hardware and anatomy. Wound was then irrigated with 1000cc of pulse lavage irrigation containing Bacitracin. Bur was then used to open the posterior aspect of the facet joints and transverse process bilaterally as well as removing of the cartilage surface of the facet joints. Bone graft that was taken from the laminectomy site was then placed in the   posterolateral gutters as well as in facet joints. Drain was then placed. Fascia was then closed using #1 Vicryl suture. Subcutaneous tissue   approximated with 2-0 Vicryl suture. Skin approximated with staples. Sterile dressing was applied. The patient tolerated the procedure well and taken to Recovery room in good   condition.

## 2018-07-17 NOTE — ANESTHESIA PREPROCEDURE EVALUATION
Anesthetic History   No history of anesthetic complications            Review of Systems / Medical History  Patient summary reviewed, nursing notes reviewed and pertinent labs reviewed    Pulmonary    COPD        Asthma        Neuro/Psych   Within defined limits           Cardiovascular    Hypertension                   GI/Hepatic/Renal     GERD           Endo/Other        Arthritis     Other Findings              Physical Exam    Airway  Mallampati: II  TM Distance: 4 - 6 cm  Neck ROM: normal range of motion   Mouth opening: Normal     Cardiovascular          Murmur, Aortic area     Dental    Dentition: Upper partial plate     Pulmonary  Breath sounds clear to auscultation               Abdominal  GI exam deferred       Other Findings            Anesthetic Plan    ASA: 3  Anesthesia type: general          Induction: Intravenous  Anesthetic plan and risks discussed with: Family and Patient

## 2018-07-17 NOTE — ANESTHESIA POSTPROCEDURE EVALUATION
Post-Anesthesia Evaluation and Assessment    Cardiovascular Function/Vital Signs  Visit Vitals    /57    Pulse 67    Temp 36.7 °C (98 °F)    Resp 18    Ht 5' 5\" (1.651 m)    Wt 103.1 kg (227 lb 5 oz)    SpO2 97%    BMI 37.83 kg/m2       Patient is status post Procedure(s):  LUMBAR 2-5 DECOMPRESSION AND FUSION WITH C-ARM, \"SPEC POP\" . Nausea/Vomiting: Controlled. Postoperative hydration reviewed and adequate. Pain:  Pain Scale 1: Numeric (0 - 10) (07/17/18 1315)  Pain Intensity 1: 0 (07/17/18 1315)   Managed. Neurological Status:   Neuro (WDL): Exceptions to WDL (07/17/18 1315)   At baseline. Mental Status and Level of Consciousness: Arousable. Pulmonary Status:   O2 Device: Nasal cannula (07/17/18 1315)   Adequate oxygenation and airway patent. Complications related to anesthesia: None    Post-anesthesia assessment completed. No concerns. Patient has met all discharge requirements.     Signed By: Micky Guzmán MD    July 17, 2018

## 2018-07-17 NOTE — PROGRESS NOTES
Problem: Mobility Impaired (Adult and Pediatric)  Goal: *Acute Goals and Plan of Care (Insert Text)  In 1-7 days pt will be able to perform:  STG  1. Bed mobility:  Demonstrate proper log-roll technique for safe initiation of rolling for OOB activities. 2.  Supine to sit to supine S with HR for meals. 3.  Sit to stand to sit S with RW/LSO in prep for ambulation. LT.  Gait:  Ambulate 150ft S with RW/LSO, for home/community mobility. 2.  Activity tolerance: Tolerate up in chair 30 minutes-1 hour for ADLs. 3.  Patient/Family Education:  Patient/family to be independent with HEP for follow-up care and safe discharge. physical Therapy EVALUATION    Patient: Contreras Morin (11 y.o. female)  Date: 2018  Primary Diagnosis: FACET ARTHROSIS, LUMBAR HNP WITH RADICULOPATHY, LUMBAGO LUMBAR RADICULPATHY, LUMBAR SPONDYLOSIS WITH RADICULOPATHY, LUMBOSACRAL SPONDYLOSIS WITH RADICULOPATHY, LUMBAR STENOSIS, ASTHMA, ELEVATED CHOLESTEROL, COPD, OBESITY, REFLUX, HISTORY OF HIATAL HERNIA,   BARRICH ESOPHAGUS  Spinal stenosis, lumbar  Procedure(s) (LRB):  LUMBAR 2-5 DECOMPRESSION AND FUSION WITH C-ARM, \"SPEC POP\"  (N/A) Day of Surgery   Precautions:   Fall, Back, Spinal    ASSESSMENT :  Based on the objective data described below, the patient presents with decreased functional mobility and independence in regard to bed mobility, transfers, gt quality and tolerance, generalized strength, pain, and safety due to recent back surgery. Pt rating pain in back 7/10 on numerical pain scale. Pt instructed and ed regarding log rolling techniques, back precautions, donning/doffing/use of LSO. Pt dry heaved w/ sitting EOB and c/o light headedness/dizziness throughout session. Pt required min A for rolling, supine<>sidelying<>sit<>stand. Pt able to participate in gt training w/ RW, LSO, GB and min A w/ antalgic pattern. Pt returned to sidelying R in bed w/ all needs within reach, SCDs applied and pillow support.    Nurse Brandon Juarez aware and present. Recommend [facility] d/c. Patient will benefit from skilled intervention to address the above impairments. Patients rehabilitation potential is considered to be Fair  Factors which may influence rehabilitation potential include:   []         None noted  []         Mental ability/status  []         Medical condition  []         Home/family situation and support systems  [x]         Safety awareness  [x]         Pain tolerance/management  []         Other:      PLAN :  Recommendations and Planned Interventions:  [x]           Bed Mobility Training             []    Neuromuscular Re-Education  [x]           Transfer Training                   []    Orthotic/Prosthetic Training  [x]           Gait Training                          []    Modalities  [x]           Therapeutic Exercises          []    Edema Management/Control  [x]           Therapeutic Activities            [x]    Patient and Family Training/Education  []           Other (comment):    Frequency/Duration: Patient will be followed by physical therapy twice daily to address goals. Discharge Recommendations: Home Health  Further Equipment Recommendations for Discharge: N/A     SUBJECTIVE:   Patient stated I am so dizzy and I haven't slept for days.     OBJECTIVE DATA SUMMARY:     Past Medical History:   Diagnosis Date    Arthritis     osteo-hips, knees, back    Asthma     Chronic obstructive pulmonary disease (HCC)     GERD (gastroesophageal reflux disease)     large haital hernia    Hypertension      Past Surgical History:   Procedure Laterality Date    ABDOMEN SURGERY PROC UNLISTED      umbilical hernia x 2    HX APPENDECTOMY      HX CHOLECYSTECTOMY      HX HYSTERECTOMY      HX KNEE ARTHROSCOPY Left      Barriers to Learning/Limitations: None  Compensate with: visual, verbal, tactile, kinesthetic cues/model  Prior Level of Function/Home Situation:   Home Situation  Home Environment: Private residence  # Steps to Enter: 1  Rails to [facility]: No  One/Two Story Residence: One story  Living Alone: No  Support Systems: Family member(s)  Patient Expects to be Discharged to[de-identified] Private residence  Current DME Used/Available at Home: Brace/Splint, Walker, rolling  Critical Behavior:  Neurologic State: Alert; Appropriate for age  Orientation Level: Oriented X4  Cognition: Appropriate decision making; Appropriate for age attention/concentration; Appropriate safety awareness; Follows commands  Psychosocial  Patient Behaviors: Calm; Cooperative  Skin Condition/Temp: Dry;Warm  Skin Integrity: Incision (comment) (back)  Skin Integumentary  Skin Color: Appropriate for ethnicity  Skin Condition/Temp: Dry;Warm  Skin Integrity: Incision (comment) (back)  Turgor: Non-tenting  Hair Growth: Present  Varicosities: Absent  Strength:    Strength: Generally decreased, functional  Tone & Sensation:   Tone: Normal  Sensation: Intact  Range Of Motion:  AROM: Generally decreased, functional  Functional Mobility:  Bed Mobility:  Rolling: Minimum assistance;Contact guard assistance (vc)  Supine to Sit: Minimum assistance; Additional time (vc)  Sit to Supine: Minimum assistance; Additional time (vc)  Scooting: Minimum assistance; Additional time (vc)  Transfers:  Sit to Stand: Minimum assistance; Additional time (vc)  Stand to Sit: Minimum assistance; Additional time (vc)  Balance:   Sitting: Intact  Standing: Intact; With support  Ambulation/Gait Training:  Distance (ft): 1 Feet (ft)  Assistive Device: Walker, rolling;Gait belt;Brace/Splint  Ambulation - Level of Assistance: Minimal assistance; Additional time (vc)  Gait Abnormalities: Antalgic;Decreased step clearance; Step to gait; Shuffling gait  Base of Support: Widened  Stance: Weight shift;Time  Speed/Mena: Slow;Shuffled  Step Length: Left shortened;Right shortened  Swing Pattern: Left asymmetrical;Right asymmetrical  Interventions: Safety awareness training; Tactile cues; Verbal cues  Therapeutic Exercises: Pain:  Pain Scale 1: Numeric (0 - 10)  Pain Intensity 1: 6  Pain Location 1: Back  Pain Intervention(s) 1: Encouraged PCA  Activity Tolerance:   Fair -  Please refer to the flowsheet for vital signs taken during this treatment. After treatment:   []         Patient left in no apparent distress sitting up in chair  [x]         Patient left in no apparent distress in bed  [x]         Call bell left within reach  [x]         Nursing notified  []         Caregiver present  []         Bed alarm activated    COMMUNICATION/EDUCATION:   [x]         Fall prevention education was provided and the patient/caregiver indicated understanding. [x]         Patient/family have participated as able in goal setting and plan of care. [x]         Patient/family agree to work toward stated goals and plan of care. []         Patient understands intent and goals of therapy, but is neutral about his/her participation. []         Patient is unable to participate in goal setting and plan of care. Thank you for this referral.  Jaci Santos, PT   Time Calculation: 33 mins      Eval Complexity: History: HIGH Complexity :3+ comorbidities / personal factors will impact the outcome/ POC Exam:MEDIUM Complexity : 3 Standardized tests and measures addressing body structure, function, activity limitation and / or participation in recreation  Presentation: MEDIUM Complexity : Evolving with changing characteristics  Clinical Decision Making:Medium Complexity amb <30' Overall Complexity:MEDIUM       Mobility  Current  CJ= 20-39%   Goal  CI= 1-19%. The severity rating is based on the Level of Assistance required for Functional Mobility and ADLs.

## 2018-07-17 NOTE — CONSULTS
Medicine Consult    Patient:  Enriqueta Hanks 82 y.o. female  Asked to evaluate patient by Dr. Raj Narvaez  Primary Care Provider:  JOSÉ MANUEL Pavon  Date of Admission:  7/17/2018  Reason for Consult: COPD, HTN        Assessment/Plan     Patient Active Problem List   Diagnosis Code    Spinal stenosis of lumbar region with neurogenic claudication M48.062    DDD (degenerative disc disease), lumbar M51.36    HNP (herniated nucleus pulposus), lumbar M51.26    Spondylolisthesis, lumbar region M43.16    Spinal stenosis, lumbar M48.061       PLAN:      -Monitor hemodynamics and hemoglobin in and postoperative. Monitor for  postoperative anemia. -Monitor kidney function. Avoid nephrotoxins. Gentle hydration.  -Continue blood pressure medications. Monitor for hypotension. If that is  to present, we may need to hold some or all of her blood pressure  medications. -COPD : continue with combivent, on breo ellipta, will use neb treatment as needed  -Routine postoperative management, pain control, and deep venous  thrombosis per admitting team.    Thank you for allowing us to participate in this patients care. HPI:   CC:  Enriqueta Hanks is a 82 y.o. female with past medical history significant for COPd, HTN is admitted by orthopedic service for back surgery. She has COPD and takes [place] and [place]. Her BP is controlled with medications. She denies any chest pain, SOB, n/v, fever/chills.     Past Medical History:   Diagnosis Date    Arthritis     osteo-hips, knees, back    Asthma     Chronic obstructive pulmonary disease (HCC)     GERD (gastroesophageal reflux disease)     large haital hernia    Hypertension      Past Surgical History:   Procedure Laterality Date    ABDOMEN SURGERY PROC UNLISTED      umbilical hernia x 2    HX APPENDECTOMY      HX CHOLECYSTECTOMY      HX HYSTERECTOMY      HX KNEE ARTHROSCOPY Left       Social History   Substance Use Topics    Smoking status: Never Smoker    Smokeless tobacco: Never Used    Alcohol use Yes      Comment: rarely     History reviewed. No pertinent family history. No current facility-administered medications on file prior to encounter. No current outpatient prescriptions on file prior to encounter. Allergies   Allergen Reactions    Aspirin Rash    Lisinopril Cough           Review of Systems  Constitutional: No fever, chills, diaphoresis, malaise, fatigue or weight gain/loss or falls  Skin: no itching or rashes  HEENT: no ear discomfort, hearing loss, tinnitus, epistaxis or sore throat  EYES: no blurry vision, double vision or photophobia  CARDIOVASCULAR: no claudication, cp, palpitations, orthopnea, pnd or LE edema  PULMONARY: no cough, wheeze, shortness of breath or sputum production  GI: no nausea, vomiting, diarrhea, abdominal pain, melena, hematemesis or brbpr  : no dysuria, hematuria  MUSCULOSKELETAL:see above  ENDOCRINE: no heat/cold intolerance, polyuria or polydipsia  HEME: no easy bruising or bleeding  NEURO: no unilateral weakness, numbness, tingling or seizures      Physical Exam:      Visit Vitals    /63    Pulse 66    Temp 98.1 °F (36.7 °C)    Resp 16    Ht 5' 5\" (1.651 m)    Wt 103.1 kg (227 lb 5 oz)    SpO2 95%    BMI 37.83 kg/m2     Body mass index is 37.83 kg/(m^2).     Physical Exam:  GEN: well nourished, laying in bed in no acute distress  HEENT: atraumatic, nose normal,oropharynx clear, MMM  NECK: supple, trachea midline, no supraclavicular or submandibular adenopathy noted  EYES: conjuctiva normal, lids with out lesions, PERRL  HEART: RRR with out m/r/g, pmi nondisplaced, pulses 2+ distally  LUNGS: equal chest wall expansion, cta bl with out wheezes/rales or rhonchi  AB: soft, +BS, nt/nd no organomegaly  NEURO: alert, awake and oriented x3, gait not assessed, cranial nerves intact, strength 5/5 bl UE and LE, sensation intact, reflexes nonpathological  SKIN: dry, intact, warm no breakdown noted        Laboratory Studies:    Recent Results (from the past 24 hour(s))   TYPE & SCREEN    Collection Time: 07/17/18  7:20 AM   Result Value Ref Range    Crossmatch Expiration 07/20/2018     ABO/Rh(D) A POSITIVE     Antibody screen NEG

## 2018-07-18 NOTE — PROGRESS NOTES
Code /Blue     Called for rapid response  Patient unresponsive while talking witnessed loss of consciousness  CPR started   Code called   Followed ACLS protocol for 35 min   PEA, Vfib,,Asystole  Protocol followed no ROSC  Time of death 46

## 2018-07-18 NOTE — PROGRESS NOTES
Problem: Falls - Risk of  Goal: *Absence of Falls  Document Matt Fall Risk and appropriate interventions in the flowsheet.    Outcome: Progressing Towards Goal  Fall Risk Interventions:  Mobility Interventions: Utilize walker, cane, or other assistive device, Patient to call before getting OOB         Medication Interventions: Patient to call before getting OOB, Teach patient to arise slowly    Elimination Interventions: Call light in reach, Patient to call for help with toileting needs

## 2018-07-18 NOTE — PROGRESS NOTES
Bedside shift change report given to John JEREZ Rn (oncoming nurse) by Roni SANDERSON RN (offgoing nurse). Report included the following information SBAR, Kardex and MAR.

## 2018-07-18 NOTE — PROGRESS NOTES
Bereavement Note:     responded to the death of Erica Ernst, who is a 82 y.o., female, offering Spiritual Care to patient and family, see flow sheets for interventions. Date of Death: 7/18/18 at 6:05    Extended Emergency Contact Information  Primary Emergency Contact: Chris Smith  Address: [address] Phone: [phone]  Mobile Phone: [phone]  Relation: Spouse  Secondary Emergency Contact: [address] Phone: [phone]  Relation: Son                 YES      NO  UNKNOWN  Life Net   []        [x]    []   Eye Bank   [] [x] []   Medical Examiner  []        [x]  []   Going to [employer]  []        [x] []      Autopsy   []        [x]         []   Sympathy Card  [x]        []  Bereavement Materials  [x]        []           Business Card Provided  [x]        []             [address] Chaplains will continue to follow family and will provide spiritual care as needed.  met  Gabriel Kerr and son Veronica Renee and his wife Johnathon Robles.  provided . ... Support for Difficult Times - . Prayer with the family. [facility] - [phone].     [address], [address] [facility] office  [phone] pager

## 2018-07-18 NOTE — PROGRESS NOTES
Problem: Falls - Risk of  Goal: *Absence of Falls  Document Matt Fall Risk and appropriate interventions in the flowsheet.    Outcome: Progressing Towards Goal  Fall Risk Interventions:  Mobility Interventions: Utilize walker, cane, or other assistive device, OT consult for ADLs, Patient to call before getting OOB, PT Consult for mobility concerns, PT Consult for assist device competence         Medication Interventions: Assess postural VS orthostatic hypotension, Patient to call before getting OOB, Teach patient to arise slowly    Elimination Interventions: Call light in reach, Elevated toilet seat, Toileting schedule/hourly rounds

## 2018-07-18 NOTE — PROGRESS NOTES
0530: Called by primary nurse Junie Neely RN to check on patient. Patient lethargic, unable to respond to verbal stimuli. RRT called. 0532: Noted patient stopped breathing and had a faint pulse. Code Blue called. CPR started immediately with chest compressions. 0533: Respiratory therapist Romy Cornejo and Marcia Snyder arrived to room, Osvaldo started rescue breathing with ambu bag, and Lauren Gil helped alternate contractions with Junie Neely RN.    6087: ICU rns Fritz Lantigua and Debra Pratt along with doctor Paulina arrived to room. Fritz Lantigua RN alternating compressions and Debra Pratt RN ready to administer meds. No pulse noted. 1382: First dose of 1 mg Epinephrine IV given by Debra Pratt RN. Anesthesia, Doron Escobra, arrived to room. 0542: Vital signs checked, no pulse, agonal breathing noted. CPR resumed with compressions By Patricia Calderon and Carlos alternating. 0543: Noted asystole on the monitor. Second dose of 1 mg Epinephrine IV given by Debra Pratt RN.      6756: No pulse, CPR resumed. Third dose of 1 mg Epinephrine given by Debra Pratt RN.     5544: Patient intubated. 5459: CPR stopped, first shock delivered. CPR resumed immediately after shock. 5830: No pulse noted, asystole on monitor, forth dose of 1 mg Epinephrine IV given by Debra Pratt RN.    0553: CPR stopped, second shock delivered. CPR resumed immediately after shock. 9295: Fifth dose of 1 mg Epinephrine IV given by Fritz Lantigua RN    9540: CPR stopped, third shock delivered. CPR resumed immediately after shock. 2367: Another dose of 1 mg Epinephrine IV given by Fritz Lantigua RN    0600: CPR stopped, pulse checked, no pulse, asystole on monitor, agonal breathing.  CPR restarted.     0602: Another dose of 1 mg Epinephrine IV given by Fritz Lantigua RN.       0709: CPR in process, another dose of 1 mg Epinephrine IV given by Fritz Lantigua RN.       40: CPR stopped, code ended    [phone]: time of death.

## 2018-07-18 NOTE — ANESTHESIA PROCEDURE NOTES
Emergent Intubation  Performed by: Magdalena Moralez  Authorized by: Magdalena Moralez     Emergent Intubation:   Patient location: med-surg. Date/Time:  7/18/2018 5:40 AM  Indications:  Respiratory failure (Code Blue in progress)    Spontaneous Ventilation: absent    Level of Consciousness: unresponsive  Preoxygenated: Yes      Airway Documentation:   Airway:  ETT - Cuffed  Technique:  Intubating stylet  Advanced Technique:  Arvizu  Insertion Site:  Oral  Blade Size:  3  ETT size (mm):  7.5  ETT Line Raymond:  Lips  ETT Insertion depth (cm):  22  Placement verified by: auscultation, EtCO2 and BBS    Attempts:  1  Difficult airway: No    Arrived to the patient's bedside, code blue in progress- compressions and Bag-Mask ventilation. Chest compressions continued during laryngoscopy.

## 2018-07-20 NOTE — DISCHARGE SUMMARY
Discharge Summary    Patient: Vladislav Mckeon               Sex: female          DOA: 2018         YOB: 1935      Age:  82 y.o.        LOS:  LOS: 0 days                Admit Date: 2018    Discharge Date:    Admission Diagnoses: FACET ARTHROSIS, LUMBAR HNP WITH RADICULOPATHY, LUMBAGO LUMBAR RADICULPATHY, LUMBAR SPONDYLOSIS WITH RADICULOPATHY, LUMBOSACRAL SPONDYLOSIS WITH RADICULOPATHY, LUMBAR STENOSIS, ASTHMA, ELEVATED CHOLESTEROL, COPD, OBESITY, REFLUX, HISTORY OF HIATAL HERNIA,   BARRICH ESOPHAGUS  Spinal stenosis, lumbar    Discharge Diagnoses:    Problem List as of 2018  Date Reviewed: 2018          Codes Class Noted - Resolved    Spinal stenosis, lumbar ICD-10-CM: M48.061  ICD-9-CM: 724.02  2018 - Present        * (Principal)Spinal stenosis of lumbar region with neurogenic claudication ICD-10-CM: M48.062  ICD-9-CM: 724.03  2018 - Present        DDD (degenerative disc disease), lumbar ICD-10-CM: M51.36  ICD-9-CM: 722.52  2018 - Present        HNP (herniated nucleus pulposus), lumbar ICD-10-CM: M51.26  ICD-9-CM: 722.10  2018 - Present        Spondylolisthesis, lumbar region ICD-10-CM: M43.16  ICD-9-CM: 738.4  2018 - Present              Discharge Condition: . Hospital Course: The patient underwent L2-5 decompression & fusion on 2018. The patient tolerated the procedure well. She was transferred to  2nd floor surgical unit in stable condition. At approximately 0530 on 18 she was found unresponsive and code blue called. ACLS was performed for 35 minutes but patient . Time of death called at [phone] on 18.         Significant Diagnostic Studies:   Recent Labs      18   0406   HGB  13.4     Recent Labs      18   0406   HCT  41.5       Discharge Medication List as of 2018 10:33 AM

## (undated) DEVICE — SUTURE ABSORBABLE BRAIDED 1-0 OS-8 CR 3X18 IN UD VICRYL J757T

## (undated) DEVICE — ROUND DISSECTORS: Brand: DEROYAL

## (undated) DEVICE — SUTURE VCRL + SZ 2-0 L18IN ABSRB VLT CT-2 1/2 CIR TAPERCUT VCP726D

## (undated) DEVICE — MEDI-VAC SUCTION HANDLE REGULAR CAPACITY: Brand: CARDINAL HEALTH

## (undated) DEVICE — ABDOMINAL PAD: Brand: DERMACEA

## (undated) DEVICE — STERILE POLYISOPRENE POWDER-FREE SURGICAL GLOVES: Brand: PROTEXIS

## (undated) DEVICE — TRAY CATH 16FR DRN BG LF -- CONVERT TO ITEM 363158

## (undated) DEVICE — Device

## (undated) DEVICE — REM POLYHESIVE ADULT PATIENT RETURN ELECTRODE: Brand: VALLEYLAB

## (undated) DEVICE — KENDALL SCD EXPRESS SLEEVES, KNEE LENGTH, MEDIUM: Brand: KENDALL SCD

## (undated) DEVICE — DRAIN KT WND 10FR RND 400ML --

## (undated) DEVICE — SYRINGE BLB 50CC IRRIG PLIABLE FNGR FLNG GRAD FLSK DISP

## (undated) DEVICE — 3.0MM PRECISION NEURO (MATCH HEAD)

## (undated) DEVICE — PACK PROCEDURE SURG LAMINECTOMY SPINE CUST

## (undated) DEVICE — SOL IRRIGATION INJ NACL 0.9% 500ML BTL

## (undated) DEVICE — FLOSEAL HEMOSTATIC MATRIX, 5 ML: Brand: FLOSEAL

## (undated) DEVICE — PREP SKN PREVAIL 40ML APPL --

## (undated) DEVICE — NON-ADHERENT STRIPS,OIL EMULSION: Brand: CURITY

## (undated) DEVICE — X-RAY SPONGES,12 PLY: Brand: DERMACEA

## (undated) DEVICE — HANDPIECE SET WITH FAN SPRAY TIP: Brand: INTERPULSE